# Patient Record
Sex: FEMALE | Race: BLACK OR AFRICAN AMERICAN | NOT HISPANIC OR LATINO | Employment: FULL TIME | URBAN - METROPOLITAN AREA
[De-identification: names, ages, dates, MRNs, and addresses within clinical notes are randomized per-mention and may not be internally consistent; named-entity substitution may affect disease eponyms.]

---

## 2023-05-25 LAB
HERPES SIMPLEX VIRUS 1 IGG: 4.5 INDEX
HERPES SIMPLEX VIRUS 2 IGG: <0.2 INDEX

## 2023-08-07 DIAGNOSIS — Z87.19 PERSONAL HISTORY OF OTHER DISEASES OF THE DIGESTIVE SYSTEM: ICD-10-CM

## 2023-08-08 RX ORDER — OMEPRAZOLE 40 MG/1
CAPSULE, DELAYED RELEASE ORAL
Qty: 30 CAPSULE | Refills: 2 | Status: SHIPPED | OUTPATIENT
Start: 2023-08-08 | End: 2023-11-07

## 2023-11-02 ENCOUNTER — TELEPHONE (OUTPATIENT)
Dept: OBSTETRICS AND GYNECOLOGY | Facility: CLINIC | Age: 28
End: 2023-11-02
Payer: COMMERCIAL

## 2023-11-02 DIAGNOSIS — B00.9 HSV (HERPES SIMPLEX VIRUS) INFECTION: ICD-10-CM

## 2023-11-03 NOTE — TELEPHONE ENCOUNTER
MARANDAVM informing pt that office will send RF request to Sariah for approval. Advised JOANN is due. Last seen 9/9/22.

## 2023-11-04 RX ORDER — VALACYCLOVIR HYDROCHLORIDE 500 MG/1
TABLET, FILM COATED ORAL
Qty: 90 TABLET | Refills: 2 | Status: SHIPPED | OUTPATIENT
Start: 2023-11-04 | End: 2024-01-04 | Stop reason: SDUPTHER

## 2023-11-06 DIAGNOSIS — B00.9 HSV (HERPES SIMPLEX VIRUS) INFECTION: ICD-10-CM

## 2023-11-06 DIAGNOSIS — Z87.19 PERSONAL HISTORY OF OTHER DISEASES OF THE DIGESTIVE SYSTEM: ICD-10-CM

## 2023-11-06 RX ORDER — VALACYCLOVIR HYDROCHLORIDE 500 MG/1
TABLET, FILM COATED ORAL
Qty: 30 TABLET | Refills: 2 | OUTPATIENT
Start: 2023-11-06

## 2023-11-07 RX ORDER — OMEPRAZOLE 40 MG/1
CAPSULE, DELAYED RELEASE ORAL
Qty: 30 CAPSULE | Refills: 2 | Status: SHIPPED | OUTPATIENT
Start: 2023-11-07 | End: 2024-02-07 | Stop reason: SDUPTHER

## 2023-12-01 ENCOUNTER — APPOINTMENT (OUTPATIENT)
Dept: PRIMARY CARE | Facility: CLINIC | Age: 28
End: 2023-12-01
Payer: COMMERCIAL

## 2023-12-08 ENCOUNTER — APPOINTMENT (OUTPATIENT)
Dept: OBSTETRICS AND GYNECOLOGY | Facility: CLINIC | Age: 28
End: 2023-12-08
Payer: COMMERCIAL

## 2023-12-22 ENCOUNTER — OFFICE VISIT (OUTPATIENT)
Dept: PRIMARY CARE | Facility: CLINIC | Age: 28
End: 2023-12-22
Payer: COMMERCIAL

## 2023-12-22 VITALS
SYSTOLIC BLOOD PRESSURE: 114 MMHG | OXYGEN SATURATION: 99 % | BODY MASS INDEX: 26.7 KG/M2 | HEIGHT: 64 IN | DIASTOLIC BLOOD PRESSURE: 58 MMHG | HEART RATE: 92 BPM | WEIGHT: 156.4 LBS

## 2023-12-22 DIAGNOSIS — Z00.00 HEALTH CARE MAINTENANCE: Primary | ICD-10-CM

## 2023-12-22 DIAGNOSIS — K61.0 PERIANAL ABSCESS: ICD-10-CM

## 2023-12-22 DIAGNOSIS — R19.4 CHANGE IN BOWEL HABITS: ICD-10-CM

## 2023-12-22 PROCEDURE — 1036F TOBACCO NON-USER: CPT | Performed by: INTERNAL MEDICINE

## 2023-12-22 PROCEDURE — 99395 PREV VISIT EST AGE 18-39: CPT | Performed by: INTERNAL MEDICINE

## 2023-12-22 RX ORDER — NORETHINDRONE ACETATE AND ETHINYL ESTRADIOL AND FERROUS FUMARATE 1MG-20(21)
KIT ORAL
COMMUNITY

## 2023-12-22 ASSESSMENT — PATIENT HEALTH QUESTIONNAIRE - PHQ9
1. LITTLE INTEREST OR PLEASURE IN DOING THINGS: SEVERAL DAYS
10. IF YOU CHECKED OFF ANY PROBLEMS, HOW DIFFICULT HAVE THESE PROBLEMS MADE IT FOR YOU TO DO YOUR WORK, TAKE CARE OF THINGS AT HOME, OR GET ALONG WITH OTHER PEOPLE: SOMEWHAT DIFFICULT
SUM OF ALL RESPONSES TO PHQ9 QUESTIONS 1 AND 2: 2
2. FEELING DOWN, DEPRESSED OR HOPELESS: SEVERAL DAYS

## 2023-12-22 NOTE — PROGRESS NOTES
"Reason for Visit: Annual Physical Exam  Allergies and Medications  Patient has no known allergies.  Current Outpatient Medications   Medication Instructions    Junel FE 1/20, 28, 1 mg-20 mcg (21)/75 mg (7) tablet TAKE 1 TABLET DAILY FOR CONTINUOUS USE    omeprazole (PriLOSEC) 40 mg DR capsule TAKE 1 CAPSULE BY MOUTH EVERY DAY    valACYclovir (Valtrex) 500 mg tablet Take one tab by mouth daily<BR>Annual exam due ASAP     HPI:  28-year-old patient presented to the office today for her annual exam patient is doing great denying chest pain or shortness of breath lying on exertion denies abdominal pain nausea vomiting changes in bowel movements or blood in stool no urine symptoms her appetite is good her energy level is adequate.    Patient was recently treated for perianal abscess by colon and rectal surgery department she received Augmentin after the abscess drained on its own.  She has history of constipation and hemorrhoids.  She noted a small lump in the anal area and she is concerned.  She has never had any perianal procedures done in the past that she has never had any gastroenterology evaluation on colonoscopies.    ROS otherwise negative aside from what was mentioned above in HPI.    Vitals  /58 (BP Location: Right arm, Patient Position: Sitting)   Pulse 92   Ht 1.626 m (5' 4\")   Wt 70.9 kg (156 lb 6.4 oz)   SpO2 99%   BMI 26.85 kg/m²   Body mass index is 26.85 kg/m².  Physical Exam  Physical Exam  Constitutional:       Appearance: Normal appearance.   Eyes:      Extraocular Movements: Extraocular movements intact.      Pupils: Pupils are equal, round, and reactive to light.   Cardiovascular:      Rate and Rhythm: Normal rate and regular rhythm.      Pulses: Normal pulses.      Heart sounds: Normal heart sounds.   Pulmonary:      Effort: Pulmonary effort is normal.      Breath sounds: Normal breath sounds.   Neurological:      General: No focal deficit present.      Mental Status: She is alert. Mental " status is at baseline.   Psychiatric:         Mood and Affect: Mood normal.         Thought Content: Thought content normal.         Judgment: Judgment normal.        Active Problem List    Comprehensive Medical/Surgical/Social/Family History  Past Medical History:   Diagnosis Date    Abnormal cytological findings in specimens from other organs, systems and tissues     Abnormal cytology    Allergy status to unspecified drugs, medicaments and biological substances 2019    History of allergy    Encounter for contraceptive management, unspecified 2019    Encounter for contraceptive management    Encounter for gynecological examination (general) (routine) with abnormal findings 2019    Encounter for well woman exam with abnormal findings    Encounter for insertion of intrauterine contraceptive device     Encounter for IUD insertion    Encounter for surveillance of implantable subdermal contraceptive 2019    Nexplanon removal    Personal history of other diseases of the digestive system 10/28/2019    History of gastroesophageal reflux (GERD)    Personal history of other diseases of the respiratory system 2015    History of asthma     Past Surgical History:   Procedure Laterality Date    OTHER SURGICAL HISTORY  2019    No history of surgery     Social History     Social History Narrative    Not on file   Never smoked, alcohol socially , one cup of coffee a week , work in education non profit manager for [employer] ,  No family history on file.   FAMILY HISTORY:  Father is  , mother has history of autoimmune illnesses mom with sarcoidosis and her twin sister and father the patients grandfather , 3 sisters and one brother in good health ,  Assessment and Plan:  Problem List Items Addressed This Visit    None  28-year-old patient with the following issues.    1.  History of recent perianal abscess status posttreatment with colorectal surgery department received  Augmentin abscess resolved currently experiencing a small lump in perianal area on exam I did not appreciate any hemorrhoids or fissures or abnormalities.  She is going to contact them and follow their recommendation from that standpoint.  I did discuss with the patient with gastroenterology referral is warranted to evaluate further for change in bowel habits and the perianal abscess that she had no prior surgeries or procedures.  She does try to avoid constipation and increase hydration.    2.  Health maintenance issues, lab work is requested she is up-to-date on Pap smear.    No other active issues or concerns I will see the patient back in the office in 1 year for physical and sooner if needed

## 2024-01-04 ENCOUNTER — OFFICE VISIT (OUTPATIENT)
Dept: OBSTETRICS AND GYNECOLOGY | Facility: CLINIC | Age: 29
End: 2024-01-04
Payer: COMMERCIAL

## 2024-01-04 ENCOUNTER — LAB (OUTPATIENT)
Dept: LAB | Facility: LAB | Age: 29
End: 2024-01-04
Payer: COMMERCIAL

## 2024-01-04 VITALS
SYSTOLIC BLOOD PRESSURE: 122 MMHG | WEIGHT: 157 LBS | BODY MASS INDEX: 26.8 KG/M2 | DIASTOLIC BLOOD PRESSURE: 60 MMHG | HEIGHT: 64 IN

## 2024-01-04 DIAGNOSIS — Z11.3 SCREEN FOR STD (SEXUALLY TRANSMITTED DISEASE): ICD-10-CM

## 2024-01-04 DIAGNOSIS — Z01.419 WELL WOMAN EXAM: Primary | ICD-10-CM

## 2024-01-04 DIAGNOSIS — Z00.00 HEALTH CARE MAINTENANCE: ICD-10-CM

## 2024-01-04 DIAGNOSIS — B00.9 HSV (HERPES SIMPLEX VIRUS) INFECTION: ICD-10-CM

## 2024-01-04 LAB
ALBUMIN SERPL BCP-MCNC: 4.4 G/DL (ref 3.4–5)
ALP SERPL-CCNC: 44 U/L (ref 33–110)
ALT SERPL W P-5'-P-CCNC: 16 U/L (ref 7–45)
ANION GAP SERPL CALC-SCNC: 11 MMOL/L (ref 10–20)
AST SERPL W P-5'-P-CCNC: 17 U/L (ref 9–39)
BILIRUB SERPL-MCNC: 0.5 MG/DL (ref 0–1.2)
BUN SERPL-MCNC: 7 MG/DL (ref 6–23)
CALCIUM SERPL-MCNC: 9.2 MG/DL (ref 8.6–10.3)
CHLORIDE SERPL-SCNC: 103 MMOL/L (ref 98–107)
CHOLEST SERPL-MCNC: 202 MG/DL (ref 0–199)
CHOLESTEROL/HDL RATIO: 3.9
CO2 SERPL-SCNC: 28 MMOL/L (ref 21–32)
CREAT SERPL-MCNC: 0.85 MG/DL (ref 0.5–1.05)
ERYTHROCYTE [DISTWIDTH] IN BLOOD BY AUTOMATED COUNT: 13.7 % (ref 11.5–14.5)
GFR SERPL CREATININE-BSD FRML MDRD: >90 ML/MIN/1.73M*2
GLUCOSE SERPL-MCNC: 88 MG/DL (ref 74–99)
HBV SURFACE AG SERPL QL IA: NONREACTIVE
HCT VFR BLD AUTO: 38.4 % (ref 36–46)
HCV AB SER QL: NONREACTIVE
HDLC SERPL-MCNC: 51.4 MG/DL
HGB BLD-MCNC: 12.5 G/DL (ref 12–16)
HIV 1+2 AB+HIV1 P24 AG SERPL QL IA: NONREACTIVE
LDLC SERPL CALC-MCNC: 142 MG/DL
MCH RBC QN AUTO: 27.4 PG (ref 26–34)
MCHC RBC AUTO-ENTMCNC: 32.6 G/DL (ref 32–36)
MCV RBC AUTO: 84 FL (ref 80–100)
NON HDL CHOLESTEROL: 151 MG/DL (ref 0–149)
NRBC BLD-RTO: 0 /100 WBCS (ref 0–0)
PLATELET # BLD AUTO: 263 X10*3/UL (ref 150–450)
POTASSIUM SERPL-SCNC: 4.1 MMOL/L (ref 3.5–5.3)
PROT SERPL-MCNC: 7.5 G/DL (ref 6.4–8.2)
RBC # BLD AUTO: 4.56 X10*6/UL (ref 4–5.2)
SODIUM SERPL-SCNC: 138 MMOL/L (ref 136–145)
T PALLIDUM AB SER QL: NONREACTIVE
TRIGL SERPL-MCNC: 44 MG/DL (ref 0–149)
TSH SERPL-ACNC: 1.03 MIU/L (ref 0.44–3.98)
VLDL: 9 MG/DL (ref 0–40)
WBC # BLD AUTO: 5.1 X10*3/UL (ref 4.4–11.3)

## 2024-01-04 PROCEDURE — 86780 TREPONEMA PALLIDUM: CPT

## 2024-01-04 PROCEDURE — 85027 COMPLETE CBC AUTOMATED: CPT

## 2024-01-04 PROCEDURE — 87340 HEPATITIS B SURFACE AG IA: CPT

## 2024-01-04 PROCEDURE — 86803 HEPATITIS C AB TEST: CPT

## 2024-01-04 PROCEDURE — 87661 TRICHOMONAS VAGINALIS AMPLIF: CPT

## 2024-01-04 PROCEDURE — 87800 DETECT AGNT MULT DNA DIREC: CPT

## 2024-01-04 PROCEDURE — 80061 LIPID PANEL: CPT

## 2024-01-04 PROCEDURE — 80053 COMPREHEN METABOLIC PANEL: CPT

## 2024-01-04 PROCEDURE — 84443 ASSAY THYROID STIM HORMONE: CPT

## 2024-01-04 PROCEDURE — 87389 HIV-1 AG W/HIV-1&-2 AB AG IA: CPT

## 2024-01-04 PROCEDURE — 36415 COLL VENOUS BLD VENIPUNCTURE: CPT

## 2024-01-04 PROCEDURE — 1036F TOBACCO NON-USER: CPT | Performed by: ADVANCED PRACTICE MIDWIFE

## 2024-01-04 PROCEDURE — 99395 PREV VISIT EST AGE 18-39: CPT | Performed by: ADVANCED PRACTICE MIDWIFE

## 2024-01-04 RX ORDER — VALACYCLOVIR HYDROCHLORIDE 500 MG/1
TABLET, FILM COATED ORAL
Qty: 90 TABLET | Refills: 3 | Status: SHIPPED | OUTPATIENT
Start: 2024-01-04

## 2024-01-04 RX ORDER — LIDOCAINE 50 MG/G
OINTMENT TOPICAL AS NEEDED
Qty: 50 G | Refills: 1 | Status: SHIPPED | OUTPATIENT
Start: 2024-01-04 | End: 2025-01-03

## 2024-01-04 ASSESSMENT — PAIN SCALES - GENERAL: PAINLEVEL: 0-NO PAIN

## 2024-01-05 LAB
C TRACH RRNA SPEC QL NAA+PROBE: NEGATIVE
N GONORRHOEA DNA SPEC QL PROBE+SIG AMP: NEGATIVE
T VAGINALIS RRNA SPEC QL NAA+PROBE: NEGATIVE

## 2024-01-17 NOTE — PROGRESS NOTES
Catherine Caputo is a 28 y.o. female with past medical history of GERD and lactose intolerance who is referred by PCP Dr. Mary Kate Trammell for change in bowel habits and perianal abscess. A few years ago she had first perianal abscess. Went to ER and had antibiotics. She states this would come and go. She states in  had an occurrence that was so painful she had difficulty walking. This was typically managed by going to the ER to receive antibiotics. Abscess is always on the same side but sometimes further up or down. Under the skin feels like it tracks under the skin. She recently saw Colorectal Surgery for a recurrence. She received Augmentin after the abscess drained on its own. Not currently draining or painful. She has never received any pelvic imaging. She states at baseline she has fluctuating bowels, previously lots of constipation. She has added probiotic and fiber which helps. She now has bowel movements pretty much daily. Occasional diarrhea about once a week. She does not see blood, but has mucus. She has lower abdominal cramping prior to defecation, nausea, and lots of bloating. Heartburn controlled with Omeprazole. If she misses a dose heartburn returns. No unintentional weight loss. Denies oral aphthous ulcerations, skin rashes, and joint pains. Recent labs reviewed. She has no iron deficiency anemia. CMP unremarkable. TSH normal. She does not take NSAIDS regularly. No prior EGD or colonoscopy.    Social history: Tries to eat healthy. Lactose intolerant and red meat bothers her. Works out at the gym. 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 Has a hybrid work schedule with 2 days at home.    Family history: Denies family history of IBD, colon cancer, or other GI disorders or malignancy. Father has lupus,  in 2016. Maternal grandfather and mother have sarcoidosis.     Past Medical History:   Diagnosis Date    Encounter for surveillance of implantable subdermal contraceptive  11/01/2019    Nexplanon removal    HSV infection     Personal history of other diseases of the digestive system 10/28/2019    History of gastroesophageal reflux (GERD)    Personal history of other diseases of the respiratory system 03/21/2015    History of asthma     Past Surgical History:   Procedure Laterality Date    COLPOSCOPY      OTHER SURGICAL HISTORY  11/01/2019    No history of surgery     Current Outpatient Medications   Medication Sig Dispense Refill    Junel FE 1/20, 28, 1 mg-20 mcg (21)/75 mg (7) tablet TAKE 1 TABLET DAILY FOR CONTINUOUS USE      lidocaine (Xylocaine) 5 % ointment Apply topically if needed for mild pain (1 - 3) (on HSV outbreak). 50 g 1    omeprazole (PriLOSEC) 40 mg DR capsule TAKE 1 CAPSULE BY MOUTH EVERY DAY 30 capsule 2    valACYclovir (Valtrex) 500 mg tablet Take one tab by mouth daily 90 tablet 3     No current facility-administered medications for this visit.     No Known Allergies    Family History   Problem Relation Name Age of Onset    Autoimmune disease Mother      Sarcoidosis Mother      Lupus Father      Prostate cancer Father      Bipolar disorder Father      Psoriasis Sister      Sarcoidosis Maternal Grandfather         Review of Systems  Review of Systems negative except as noted in HPI.    Objective     Unable to perform physical exam due to telemedicine visit.    Assessment/Plan     28 y.o. female who presents today for initial clinic visit for recurrent perianal abscess with change in bowels with mucus, lower abdominal pain, nausea, and bloating. Will exclude Crohn's disease with labs, stool test, pelvic imaging, and colonoscopy. This was discussed in detail today.    Recommendations  Continue fiber supplement daily.  Obtain labs and fecal calprotectin.  Obtain pelvic MRI to further evaluate perianal abscess/possible fistula.  Schedule colonoscopy. Discussed procedure and she is agreeable. Golytely sent to pharmacy.  Follow up after procedure, or sooner if  needed.    I performed this visit using real-time telehealth tools, including audio/video connection between patient at their home and myself at home.     Electronically signed by: Melissa David CNP on 1/19/2024 at 10:14 AM

## 2024-01-19 ENCOUNTER — TELEMEDICINE (OUTPATIENT)
Dept: GASTROENTEROLOGY | Facility: CLINIC | Age: 29
End: 2024-01-19
Payer: COMMERCIAL

## 2024-01-19 DIAGNOSIS — K61.0 PERIANAL ABSCESS: Primary | ICD-10-CM

## 2024-01-19 DIAGNOSIS — R19.5 MUCUS IN STOOL: ICD-10-CM

## 2024-01-19 DIAGNOSIS — R10.30 LOWER ABDOMINAL PAIN: ICD-10-CM

## 2024-01-19 DIAGNOSIS — R19.4 CHANGE IN BOWEL HABITS: ICD-10-CM

## 2024-01-19 PROCEDURE — 99204 OFFICE O/P NEW MOD 45 MIN: CPT | Performed by: NURSE PRACTITIONER

## 2024-01-19 RX ORDER — POLYETHYLENE GLYCOL 3350, SODIUM SULFATE ANHYDROUS, SODIUM BICARBONATE, SODIUM CHLORIDE, POTASSIUM CHLORIDE 236; 22.74; 6.74; 5.86; 2.97 G/4L; G/4L; G/4L; G/4L; G/4L
4000 POWDER, FOR SOLUTION ORAL ONCE
Qty: 4000 ML | Refills: 0 | Status: SHIPPED | OUTPATIENT
Start: 2024-01-19 | End: 2024-01-19

## 2024-02-07 DIAGNOSIS — Z87.19 PERSONAL HISTORY OF OTHER DISEASES OF THE DIGESTIVE SYSTEM: ICD-10-CM

## 2024-02-07 RX ORDER — OMEPRAZOLE 40 MG/1
CAPSULE, DELAYED RELEASE ORAL
Qty: 30 CAPSULE | Refills: 2 | Status: SHIPPED | OUTPATIENT
Start: 2024-02-07 | End: 2024-05-10

## 2024-03-08 NOTE — ASSESSMENT & PLAN NOTE
Allergy panel showed class 2 allergy to grass and dog danger, mild sensitivity to cat danger.  Will refer to allergy.    Pap due 2 years  STI screening

## 2024-05-10 DIAGNOSIS — Z87.19 PERSONAL HISTORY OF OTHER DISEASES OF THE DIGESTIVE SYSTEM: ICD-10-CM

## 2024-05-10 RX ORDER — OMEPRAZOLE 40 MG/1
CAPSULE, DELAYED RELEASE ORAL
Qty: 30 CAPSULE | Refills: 2 | Status: SHIPPED | OUTPATIENT
Start: 2024-05-10

## 2024-10-02 DIAGNOSIS — Z87.19 PERSONAL HISTORY OF OTHER DISEASES OF THE DIGESTIVE SYSTEM: ICD-10-CM

## 2024-10-03 RX ORDER — OMEPRAZOLE 40 MG/1
CAPSULE, DELAYED RELEASE ORAL
Qty: 30 CAPSULE | Refills: 2 | Status: SHIPPED | OUTPATIENT
Start: 2024-10-03

## 2024-11-21 ENCOUNTER — APPOINTMENT (OUTPATIENT)
Dept: PRIMARY CARE | Facility: CLINIC | Age: 29
End: 2024-11-21
Payer: COMMERCIAL

## 2024-11-21 ENCOUNTER — LAB (OUTPATIENT)
Dept: LAB | Facility: LAB | Age: 29
End: 2024-11-21
Payer: COMMERCIAL

## 2024-11-21 VITALS
BODY MASS INDEX: 26.5 KG/M2 | HEART RATE: 67 BPM | SYSTOLIC BLOOD PRESSURE: 118 MMHG | OXYGEN SATURATION: 97 % | HEIGHT: 64 IN | WEIGHT: 155.2 LBS | DIASTOLIC BLOOD PRESSURE: 78 MMHG

## 2024-11-21 DIAGNOSIS — R10.30 LOWER ABDOMINAL PAIN: ICD-10-CM

## 2024-11-21 DIAGNOSIS — Z00.00 HEALTH CARE MAINTENANCE: Primary | ICD-10-CM

## 2024-11-21 DIAGNOSIS — M54.50 MIDLINE LOW BACK PAIN WITHOUT SCIATICA, UNSPECIFIED CHRONICITY: ICD-10-CM

## 2024-11-21 DIAGNOSIS — K61.0 PERIANAL ABSCESS: ICD-10-CM

## 2024-11-21 DIAGNOSIS — R19.4 CHANGE IN BOWEL HABITS: ICD-10-CM

## 2024-11-21 DIAGNOSIS — Z00.00 HEALTH CARE MAINTENANCE: ICD-10-CM

## 2024-11-21 DIAGNOSIS — Z81.8 FAMILY HISTORY OF SCHIZOPHRENIA: ICD-10-CM

## 2024-11-21 DIAGNOSIS — F32.A DEPRESSION, UNSPECIFIED DEPRESSION TYPE: ICD-10-CM

## 2024-11-21 LAB
ALBUMIN SERPL BCP-MCNC: 4.3 G/DL (ref 3.4–5)
ALBUMIN SERPL BCP-MCNC: 4.5 G/DL (ref 3.4–5)
ALP SERPL-CCNC: 43 U/L (ref 33–110)
ALP SERPL-CCNC: 46 U/L (ref 33–110)
ALT SERPL W P-5'-P-CCNC: 14 U/L (ref 7–45)
ALT SERPL W P-5'-P-CCNC: 15 U/L (ref 7–45)
ANION GAP SERPL CALC-SCNC: 10 MMOL/L (ref 10–20)
ANION GAP SERPL CALC-SCNC: 11 MMOL/L (ref 10–20)
AST SERPL W P-5'-P-CCNC: 14 U/L (ref 9–39)
AST SERPL W P-5'-P-CCNC: 16 U/L (ref 9–39)
BILIRUB SERPL-MCNC: 0.8 MG/DL (ref 0–1.2)
BILIRUB SERPL-MCNC: 0.8 MG/DL (ref 0–1.2)
BUN SERPL-MCNC: 11 MG/DL (ref 6–23)
BUN SERPL-MCNC: 11 MG/DL (ref 6–23)
CALCIUM SERPL-MCNC: 10.1 MG/DL (ref 8.6–10.6)
CALCIUM SERPL-MCNC: 9.7 MG/DL (ref 8.6–10.6)
CHLORIDE SERPL-SCNC: 102 MMOL/L (ref 98–107)
CHLORIDE SERPL-SCNC: 103 MMOL/L (ref 98–107)
CHOLEST SERPL-MCNC: 168 MG/DL (ref 0–199)
CHOLESTEROL/HDL RATIO: 3.4
CO2 SERPL-SCNC: 28 MMOL/L (ref 21–32)
CO2 SERPL-SCNC: 29 MMOL/L (ref 21–32)
CREAT SERPL-MCNC: 0.81 MG/DL (ref 0.5–1.05)
CREAT SERPL-MCNC: 0.83 MG/DL (ref 0.5–1.05)
CRP SERPL-MCNC: <0.1 MG/DL
EGFRCR SERPLBLD CKD-EPI 2021: >90 ML/MIN/1.73M*2
EGFRCR SERPLBLD CKD-EPI 2021: >90 ML/MIN/1.73M*2
ERYTHROCYTE [DISTWIDTH] IN BLOOD BY AUTOMATED COUNT: 13.4 % (ref 11.5–14.5)
ERYTHROCYTE [DISTWIDTH] IN BLOOD BY AUTOMATED COUNT: 13.5 % (ref 11.5–14.5)
GLUCOSE SERPL-MCNC: 83 MG/DL (ref 74–99)
GLUCOSE SERPL-MCNC: 86 MG/DL (ref 74–99)
HCT VFR BLD AUTO: 36.8 % (ref 36–46)
HCT VFR BLD AUTO: 37.9 % (ref 36–46)
HDLC SERPL-MCNC: 48.8 MG/DL
HGB BLD-MCNC: 12 G/DL (ref 12–16)
HGB BLD-MCNC: 12.1 G/DL (ref 12–16)
LDLC SERPL CALC-MCNC: 111 MG/DL
MCH RBC QN AUTO: 27.6 PG (ref 26–34)
MCH RBC QN AUTO: 28 PG (ref 26–34)
MCHC RBC AUTO-ENTMCNC: 31.9 G/DL (ref 32–36)
MCHC RBC AUTO-ENTMCNC: 32.6 G/DL (ref 32–36)
MCV RBC AUTO: 86 FL (ref 80–100)
MCV RBC AUTO: 86 FL (ref 80–100)
NON HDL CHOLESTEROL: 119 MG/DL (ref 0–149)
NRBC BLD-RTO: 0 /100 WBCS (ref 0–0)
NRBC BLD-RTO: 0 /100 WBCS (ref 0–0)
PLATELET # BLD AUTO: 277 X10*3/UL (ref 150–450)
PLATELET # BLD AUTO: 284 X10*3/UL (ref 150–450)
POTASSIUM SERPL-SCNC: 4.1 MMOL/L (ref 3.5–5.3)
POTASSIUM SERPL-SCNC: 4.2 MMOL/L (ref 3.5–5.3)
PROT SERPL-MCNC: 7.2 G/DL (ref 6.4–8.2)
PROT SERPL-MCNC: 7.3 G/DL (ref 6.4–8.2)
RBC # BLD AUTO: 4.29 X10*6/UL (ref 4–5.2)
RBC # BLD AUTO: 4.39 X10*6/UL (ref 4–5.2)
SODIUM SERPL-SCNC: 137 MMOL/L (ref 136–145)
SODIUM SERPL-SCNC: 138 MMOL/L (ref 136–145)
TRIGL SERPL-MCNC: 39 MG/DL (ref 0–149)
TSH SERPL-ACNC: 1.09 MIU/L (ref 0.44–3.98)
VLDL: 8 MG/DL (ref 0–40)
WBC # BLD AUTO: 5.8 X10*3/UL (ref 4.4–11.3)
WBC # BLD AUTO: 5.8 X10*3/UL (ref 4.4–11.3)

## 2024-11-21 PROCEDURE — 80053 COMPREHEN METABOLIC PANEL: CPT

## 2024-11-21 PROCEDURE — 86140 C-REACTIVE PROTEIN: CPT

## 2024-11-21 PROCEDURE — 85027 COMPLETE CBC AUTOMATED: CPT

## 2024-11-21 PROCEDURE — 3008F BODY MASS INDEX DOCD: CPT | Performed by: INTERNAL MEDICINE

## 2024-11-21 PROCEDURE — 84443 ASSAY THYROID STIM HORMONE: CPT

## 2024-11-21 PROCEDURE — 36415 COLL VENOUS BLD VENIPUNCTURE: CPT

## 2024-11-21 PROCEDURE — 80061 LIPID PANEL: CPT

## 2024-11-21 PROCEDURE — 99395 PREV VISIT EST AGE 18-39: CPT | Performed by: INTERNAL MEDICINE

## 2024-11-21 RX ORDER — NAPROXEN 500 MG/1
500 TABLET ORAL 2 TIMES DAILY PRN
Qty: 60 TABLET | Refills: 0 | Status: SHIPPED | OUTPATIENT
Start: 2024-11-21 | End: 2025-02-19

## 2024-11-21 RX ORDER — ETONOGESTREL 68 MG/1
1 IMPLANT SUBCUTANEOUS ONCE
COMMUNITY

## 2024-11-21 ASSESSMENT — PATIENT HEALTH QUESTIONNAIRE - PHQ9
1. LITTLE INTEREST OR PLEASURE IN DOING THINGS: SEVERAL DAYS
2. FEELING DOWN, DEPRESSED OR HOPELESS: SEVERAL DAYS
SUM OF ALL RESPONSES TO PHQ9 QUESTIONS 1 AND 2: 2
10. IF YOU CHECKED OFF ANY PROBLEMS, HOW DIFFICULT HAVE THESE PROBLEMS MADE IT FOR YOU TO DO YOUR WORK, TAKE CARE OF THINGS AT HOME, OR GET ALONG WITH OTHER PEOPLE: VERY DIFFICULT

## 2024-11-21 NOTE — PROGRESS NOTES
"Reason for Visit: Annual Physical Exam  Allergies and Medications  Patient has no known allergies.  Current Outpatient Medications   Medication Instructions    etonogestrel-eluting contraceptive (Nexplanon) 68 mg implant implant 1 each, Once    lidocaine (Xylocaine) 5 % ointment Topical, As needed    omeprazole (PriLOSEC) 40 mg DR capsule TAKE 1 CAPSULE BY MOUTH EVERY DAY    valACYclovir (Valtrex) 500 mg tablet Take one tab by mouth daily     HPI:    28-year-old patient presented to the office today for her annual exam.  Patient is doing really well she denies episodes of chest pain or difficulty breathing not even on exertion no abdominal pain nausea or vomiting changes in the bowel movements or blood in the stool.  She does have history of acid reflux and it is controlled with the proton pump inhibitor.  She does have a history of perianal abscess I did refer her to gastroenterology to further evaluate patient is supposed to proceed with colonoscopy and evaluation with gastroenterology to rule out any underlying diagnosis of inflammatory bowel disease.  Her bowel movements now are improved.  She denies urine symptoms.  She is dealing with a lot of stress in her life related to her recent promotion at work she is concerned because she does not have a family history of schizophrenia and she wants to be evaluated in psychiatry.  ROS otherwise negative aside from what was mentioned above in HPI.    Vitals  /78 (BP Location: Left arm, Patient Position: Sitting, BP Cuff Size: Adult)   Pulse 67   Ht 1.626 m (5' 4\")   Wt 70.4 kg (155 lb 3.2 oz)   SpO2 97%   BMI 26.64 kg/m²   Body mass index is 26.64 kg/m².  Physical Exam  Physical Exam  Constitutional:       Appearance: Normal appearance.   HENT:      Head: Normocephalic and atraumatic.   Eyes:      Extraocular Movements: Extraocular movements intact.      Pupils: Pupils are equal, round, and reactive to light.   Cardiovascular:      Rate and Rhythm: Normal " rate and regular rhythm.      Pulses: Normal pulses.      Heart sounds: Normal heart sounds.   Pulmonary:      Effort: Pulmonary effort is normal.      Breath sounds: Normal breath sounds.   Chest:      Comments: Breast exam completed no masses asymmetry or discharge.  Abdominal:      General: Abdomen is flat. Bowel sounds are normal.      Palpations: Abdomen is soft.   Neurological:      General: No focal deficit present.      Mental Status: She is alert. Mental status is at baseline.   Psychiatric:         Mood and Affect: Mood normal.         Thought Content: Thought content normal.         Judgment: Judgment normal.            Active Problem List    Comprehensive Medical/Surgical/Social/Family History  Past Medical History:   Diagnosis Date    Encounter for surveillance of implantable subdermal contraceptive 11/01/2019    Nexplanon removal    HSV infection     Personal history of other diseases of the digestive system 10/28/2019    History of gastroesophageal reflux (GERD)    Personal history of other diseases of the respiratory system 03/21/2015    History of asthma     Past Surgical History:   Procedure Laterality Date    COLPOSCOPY      OTHER SURGICAL HISTORY  11/01/2019    No history of surgery     Social History     Social History Narrative    Not on file   Patient does not smoke drinks alcohol socially drinks 1 cup of coffee a day she is a  on 2 TRiQ.  Family History   Problem Relation Name Age of Onset    Autoimmune disease Mother      Sarcoidosis Mother      Lupus Father      Prostate cancer Father      Bipolar disorder Father      Psoriasis Sister      Sarcoidosis Maternal Grandfather            Assessment and Plan:  Problem List Items Addressed This Visit    None  28-year-old patient with the following issues.    1.  History of acid reflux controlled with the use of proton pump inhibitor the plan is to continue no changes.    2.  Recurrent perianal abscess currently no issues  patient definitely needs GI evaluation she already met with gastro she is supposed to proceed with a colonoscopy and I encouraged her to do so.    3.  Concerns regarding depression patient is dealing with a lot of stressors in her life lately related to work she wants to do counseling referral was provided and also referral to psychiatry was provided patient does have family history of bipolar as well as schizophrenia and would like evaluation and psychiatry.    4.  Healthcare maintenance issues patient is up-to-date on her Pap smear and lab work is requested we will discuss results once available.    Disposition I will see the patient back in the office in 1 year for repeat physical and sooner

## 2024-12-02 DIAGNOSIS — T75.3XXS MOTION SICKNESS, SEQUELA: Primary | ICD-10-CM

## 2024-12-02 RX ORDER — SCOLOPAMINE TRANSDERMAL SYSTEM 1 MG/1
1 PATCH, EXTENDED RELEASE TRANSDERMAL
Qty: 10 PATCH | Refills: 0 | Status: SHIPPED | OUTPATIENT
Start: 2024-12-02 | End: 2025-01-31

## 2024-12-30 DIAGNOSIS — B00.9 HSV (HERPES SIMPLEX VIRUS) INFECTION: ICD-10-CM

## 2024-12-31 RX ORDER — VALACYCLOVIR HYDROCHLORIDE 500 MG/1
TABLET, FILM COATED ORAL
Qty: 30 TABLET | Refills: 3 | Status: SHIPPED | OUTPATIENT
Start: 2024-12-31

## 2025-01-14 ENCOUNTER — APPOINTMENT (OUTPATIENT)
Dept: BEHAVIORAL HEALTH | Facility: CLINIC | Age: 30
End: 2025-01-14
Payer: COMMERCIAL

## 2025-01-14 VITALS
TEMPERATURE: 98.5 F | WEIGHT: 156 LBS | RESPIRATION RATE: 16 BRPM | SYSTOLIC BLOOD PRESSURE: 133 MMHG | BODY MASS INDEX: 26.63 KG/M2 | DIASTOLIC BLOOD PRESSURE: 73 MMHG | HEART RATE: 66 BPM | HEIGHT: 64 IN

## 2025-01-14 DIAGNOSIS — F41.9 ANXIETY: ICD-10-CM

## 2025-01-14 DIAGNOSIS — F33.1 MAJOR DEPRESSIVE DISORDER, RECURRENT EPISODE, MODERATE: ICD-10-CM

## 2025-01-14 PROCEDURE — 3008F BODY MASS INDEX DOCD: CPT | Performed by: NURSE PRACTITIONER

## 2025-01-14 PROCEDURE — 99205 OFFICE O/P NEW HI 60 MIN: CPT | Performed by: NURSE PRACTITIONER

## 2025-01-14 RX ORDER — SERTRALINE HYDROCHLORIDE 25 MG/1
25 TABLET, FILM COATED ORAL DAILY
Qty: 30 TABLET | Refills: 1 | Status: SHIPPED | OUTPATIENT
Start: 2025-01-14 | End: 2025-03-15

## 2025-01-14 RX ORDER — HYDROXYZINE HYDROCHLORIDE 10 MG/1
10 TABLET, FILM COATED ORAL 3 TIMES DAILY PRN
Qty: 90 TABLET | Refills: 0 | Status: SHIPPED | OUTPATIENT
Start: 2025-01-14 | End: 2025-02-13

## 2025-01-14 ASSESSMENT — PATIENT HEALTH QUESTIONNAIRE - PHQ9
9. THOUGHTS THAT YOU WOULD BE BETTER OFF DEAD, OR OF HURTING YOURSELF: SEVERAL DAYS
1. LITTLE INTEREST OR PLEASURE IN DOING THINGS: SEVERAL DAYS
10. IF YOU CHECKED OFF ANY PROBLEMS, HOW DIFFICULT HAVE THESE PROBLEMS MADE IT FOR YOU TO DO YOUR WORK, TAKE CARE OF THINGS AT HOME, OR GET ALONG WITH OTHER PEOPLE: SOMEWHAT DIFFICULT
3. TROUBLE FALLING OR STAYING ASLEEP: NEARLY EVERY DAY
5. POOR APPETITE OR OVEREATING: MORE THAN HALF THE DAYS
SUM OF ALL RESPONSES TO PHQ QUESTIONS 1-9: 17
7. TROUBLE CONCENTRATING ON THINGS, SUCH AS READING THE NEWSPAPER OR WATCHING TELEVISION: NEARLY EVERY DAY
SUM OF ALL RESPONSES TO PHQ9 QUESTIONS 1 & 2: 2
2. FEELING DOWN, DEPRESSED OR HOPELESS: SEVERAL DAYS
4. FEELING TIRED OR HAVING LITTLE ENERGY: MORE THAN HALF THE DAYS
8. MOVING OR SPEAKING SO SLOWLY THAT OTHER PEOPLE COULD HAVE NOTICED. OR THE OPPOSITE, BEING SO FIGETY OR RESTLESS THAT YOU HAVE BEEN MOVING AROUND A LOT MORE THAN USUAL: MORE THAN HALF THE DAYS
6. FEELING BAD ABOUT YOURSELF - OR THAT YOU ARE A FAILURE OR HAVE LET YOURSELF OR YOUR FAMILY DOWN: MORE THAN HALF THE DAYS

## 2025-01-14 ASSESSMENT — COLUMBIA-SUICIDE SEVERITY RATING SCALE - C-SSRS
6. HAVE YOU EVER DONE ANYTHING, STARTED TO DO ANYTHING, OR PREPARED TO DO ANYTHING TO END YOUR LIFE?: NO
2. HAVE YOU ACTUALLY HAD ANY THOUGHTS OF KILLING YOURSELF?: NO
ATTEMPT LIFETIME: NO
TOTAL  NUMBER OF INTERRUPTED ATTEMPTS LIFETIME: NO
1. HAVE YOU WISHED YOU WERE DEAD OR WISHED YOU COULD GO TO SLEEP AND NOT WAKE UP?: NO
TOTAL  NUMBER OF ABORTED OR SELF INTERRUPTED ATTEMPTS LIFETIME: NO

## 2025-01-14 ASSESSMENT — ANXIETY QUESTIONNAIRES
1. FEELING NERVOUS, ANXIOUS, OR ON EDGE: NEARLY EVERY DAY
7. FEELING AFRAID AS IF SOMETHING AWFUL MIGHT HAPPEN: NEARLY EVERY DAY
6. BECOMING EASILY ANNOYED OR IRRITABLE: NEARLY EVERY DAY
4. TROUBLE RELAXING: NEARLY EVERY DAY
3. WORRYING TOO MUCH ABOUT DIFFERENT THINGS: MORE THAN HALF THE DAYS
IF YOU CHECKED OFF ANY PROBLEMS ON THIS QUESTIONNAIRE, HOW DIFFICULT HAVE THESE PROBLEMS MADE IT FOR YOU TO DO YOUR WORK, TAKE CARE OF THINGS AT HOME, OR GET ALONG WITH OTHER PEOPLE: VERY DIFFICULT
GAD7 TOTAL SCORE: 19
2. NOT BEING ABLE TO STOP OR CONTROL WORRYING: NEARLY EVERY DAY
5. BEING SO RESTLESS THAT IT IS HARD TO SIT STILL: MORE THAN HALF THE DAYS

## 2025-01-14 NOTE — PROGRESS NOTES
"Time In: 0852  Time Out: 0948        Subjective   Catherine Caputo, a 29 y.o. female, presenting to Psychiatry for evaluation and medication management     Preferred Name:  Catherine    Reason for visit:  Medication management    Chief Complaint:  \"I am trying to get my life together.\"    Current Mood:  \"Anxious\"    HPI  Catherine Caputo is a 29 y.o. female patient with a chief complaint of medication management presenting to outpatient treatment for a scheduled psych outpatient psychiatric evaluation.    The patient reports, \"I am trying to get my life together.\" She reports that she is turning 30 this year. She reports that she has had a tough time this year and her normal coping skills haven't been working lately. She reports that she has had panic attacks while [place] [address]. She reports that she had to pull over because she was crying and couldn't breathe. She reports that she had a break down at her last physical with her PCP who referred her to psych. She reports that she has had 2 therapy appointments so far with a therapist at [facility]. She reports that she wants to make sure that she has what she needs to handle life. She repots that she is currently applying to business school and moved back home. She reports that she does not want to go back to the \"bad space\" she was recently in. She reports that she has a family history of schizophrenia and bipolar. She reports that her father had bipolar and schizophrenia and a paternal sister with bipolar. She reports that there are autoimmune disorders on both sides of the family. She reports that she is a spiritual person and sometimes worries that she feels God is talking to her but then worries that she is hallucinating. She reports that last winter, she was not in a good place, issues with a relationship, hopelessness, feeling like wanting to be done, exhaustion, stress with work and family. She reports that she works very hard to develop healthier " "thought processes because she was having suicidal ideation. She reports that she went to a group therapy session on suicide awareness that she found very helpful. She reports that she has had suicidal thoughts since college. She reports that in 2020, one of her friends passed away via possible suicide on vacation. She reports that she has had some \"really dark days\" in the past in regards to suicide but that she has not acted on the thoughts. She reports that she is able to talk herself out of the thoughts due to protective factors. She reports that she wants to live. She reports that she has been tracking her cycle because she recognizes that it correlates with her periods when her moods get worse. She reports that she takes a supplement that she finds helpful with the symptoms. She reports that living back at home with her mother and youngest sister which has been difficult.     She denies any prior suicide attempts, psychiatric hospitalizations, self harm, or violence. She reports that she has never previously received mental health treatment. She reports that she is motivated to get treatment for her mental health. She reports that she is seeing her therapist every other week. She denies any current suicidal ideation but states that she will get intrusive thoughts of suicide when she gets distressed or overwhelmed. She denies that she has acted on these thoughts in the past.         ADULT Psychiatric Review of Symptoms:  Anxiety: ARIANE       ARIANE: difficult to control worry, excessive anxiety/worry, difficulty concentrating, easily fatigue, irritability, restlessness, sleep disturbance, and See HPI    OCD: Denies    Panic: See HPI    PTSD: Denies    ADHD: Denies    Depression: Depressed mood, anhedonia, appetite decrease, concentration poor, Energy decreased, guilt, loss of interest, sleep decreased, Passive suicidal thoughts, tearfulness, and See HPI    Delirium: Denies    Psychosis: Denies    Laura: " "Denies    Safety Issues: passive death wish         HISTORY    Past Psychiatric History  Current diagnosis: depression, anxiety  Current therapist: Hanna Laguerre at [facility]; sees every other week  Outpatient treatment history: first time seeking treatment is currently  Inpatient treatment history: denies  Substance abuse treatment: denies  History of suicide attempts: denies  History of self-harm: denies  History of violence: denies  Current psychiatric medications: denies  Past psychiatric medications: denies    Addiction/Substance Use  Alcohol use: currently on \"dry January\" but only occasionally has alcohol, social drinking about once per month about 2 glasses of wine or 3 shots  Nicotine use: denies  Drug use:    Opioids: denies   Cocaine: denies   Cannabis/[place] 8: occasional gummies, none in the past years   Methamphetamines: denies   Hallucinogens:  denies  Caffeine use: 1 cup of coffee daily      Social/Developmental History  Occupation: works for college now for a   Relationship status: Never   Household members: lives with mother and youngest sister  Children: none  Siblings: 4 on mom's side; 2 on dad's side  Family relationships: 2 friends are main supports  Stressors: work, applying to business school, moving back in with mom  Grade/school: graduated high school and undergrad; currently applying to business school  Learning problems/IEP: denies  Abuse/neglect history: denies   history: denies  Legal history: denies  Employment history: full time  Interests/strengths: cooking  Guns in the home: denies      Family Psychiatric History  Mental Health: father with schizophrenia and bipolar; sister with bipolar  Substance Abuse: denies  Suicide: 3 friends       Medical History  -PCP: Mary Kate Trammell MD  -TBI/head trauma/LOC/seizure hx: denies  -Medical: denies  -Surgical: denies       Falls  History of falls: No  Have you fallen in the past 12 months: " "No      High Blood pressure  No      Depression Screening:   PHQ9 score: 17  Plan: Medication, Therapy, and Follow up with this writer      Anxiety Screening:  ARIANE-7 Score: 19  Plan: Medication, Therapy, and Follow up with this writer    Patient Health Questionnaire-9 Score: 17 (1/14/2025  9:11 AM)  ARIANE-7 Total Score: 19 (1/14/2025  9:13 AM)       Record Review: brief      Mental Status Exam:  General appearance: Appears state age, appropriate eye contact, adequate grooming and hygiene  Attitude: Calm, cooperative, and engaged in conversation.  Behavior: Appropriate eye contact.   Motor Activity: No psychomotor agitation or retardation. No abnormal movements, tremors or tics. No evidence of extrapyramidal symptoms or tardive dyskinesia.  Speech: Regular rate, rhythm, volume. Spontaneous, no pressured speech.  Mood: \"Anxious.\"   Affect: Appropriate, full range, mood congruent.  Thought Process: Linear, logical, and goal-directed. No loose associations or gross thought disorganization.  Thought Content: Denied current suicidal ideation or thoughts of harm to self, denied homicidal ideation or thoughts of harm to others. No delusional thinking elicited. No perseverations or obsessions identified.   Perception: Did not endorse auditory or visual hallucinations, did not appear to be responding to hallucinatory stimuli.   Cognition: Alert, oriented x3. Preserved attention span and concentration, recent and remote memory. Adequate fund of knowledge. No deficits in language.   Insight: Good, in regards to understanding mental health condition  Judgement: Good           Review of Systems  Eyes  no discharge, no pain  Ears, Nose, Mouth, Throat  no pain, no rhinorrhea, no dysphagia  Resp  no dyspnea, no cough  GI  no nausea, vomiting, diarrhea    no urgency, no dysuria  Muskuloskeletal  no pain, no weakness  Integumentary  no rash  Endocrine   no polyurea  Hematologic  no bruising, no bleeding problems  CV  no palpitations, " no pain  Pulm  no chest pain  Neuro  no weakness, no coordination problems, no dizziness  Constitutional  energy, appetite normal  Psychiatric  see psychiatric review of systems and HPI      OARRS:  Xena Chavez, APRN-CNP on 1/14/2025  8:50 AM  I have personally reviewed the OARRS report for Catherine Caputo. I have considered the risks of abuse, dependence, addiction and diversion        Vitals:  Vitals:    01/14/25 0850   BP: 133/73   Pulse: 66   Resp: 16   Temp: 36.9 °C (98.5 °F)           Current Medications  Current Outpatient Medications on File Prior to Visit   Medication Sig Dispense Refill    etonogestrel-eluting contraceptive (Nexplanon) 68 mg implant implant 1 each by subdermal route 1 time.      naproxen (Naprosyn) 500 mg tablet Take 1 tablet (500 mg) by mouth 2 times a day as needed for mild pain (1 - 3) (pain). 60 tablet 0    omeprazole (PriLOSEC) 40 mg DR capsule TAKE 1 CAPSULE BY MOUTH EVERY DAY 30 capsule 2    scopolamine (Transderm-Scop) 1 mg over 3 days patch 3 day Place 1 patch over 72 hours on the skin every 3rd day if needed (nausea). 10 patch 0    valACYclovir (Valtrex) 500 mg tablet TAKE 1 TABLET BY MOUTH EVERY DAY 30 tablet 3     No current facility-administered medications on file prior to visit.          MEDICAL-DECISION MAKING  Moderate: 1 or more chronic illnesses with exacerbation, progression, or side effects of treatment with Prescription drug management    Time: 63 minutes       IMPRESSION  This is a 29-year-old female who presents for psychiatric evaluation for management of depression and anxiety.  The patient has never previously sought mental health treatment.  The patient does endorse symptoms that correlate with major depressive disorder, recurrent, moderate and anxiety.  The patient recently started therapy within the past couple weeks and is seeing the therapist biweekly.  The patient has never previously been on psychiatric medications but is agreeable to starting Zoloft  25 mg daily for depression and anxiety as well as utilizing as needed hydroxyzine for breakthrough anxiety.  The patient denies current suicidal ideation but does report a history of passive thoughts of death and intrusive suicidal thoughts.  The patient denies ever acting on these thoughts and feels she can manage them at this time when they do occur.  The patient has no prior suicide attempts or psychiatric hospitalizations.  The patient is help seeking and future oriented.  The patient is motivated and engaging in mental health treatment.  The patient is agreeable to following up with this writer on 2/18/2025.     Diagnoses  Problem List Items Addressed This Visit    None  Visit Diagnoses       Major depressive disorder, recurrent episode, moderate        Relevant Medications    sertraline (Zoloft) 25 mg tablet    Other Relevant Orders    Follow Up In Psychiatry    Anxiety        Relevant Medications    sertraline (Zoloft) 25 mg tablet    hydrOXYzine HCL (Atarax) 10 mg tablet    Other Relevant Orders    Follow Up In Psychiatry             SI/HI ASSESSMENT  -Risk Assessment: Catherine Caputo is currently a low acute risk of suicide and self-harm due to no past suicide attempt(s) and not currently endorsing thoughts of suicide. Catherine Caputo is currently a low acute risk of violence and harm to others due to no past history of violence and not currently threatening others.  -Suicidal Risk Factors: unmarried/single, current psychiatric illness, and panic attacks  -Violence Risk Factors: none  -Protective Factors: Yazdanism affiliation/spirituality, strong coping skills, sense of responsibility towards family, social support/connectedness, hopefulness/future orientation, and employment  -Plan to Reduce Risk: Establish medication regimen, outpatient follow-up care, and increase coping skills .         [place] suicide severity rating scale  Suicidal and self-injurious behavior in the past 3 months: denies    Suicidal  and self-injurious behavior in lifetime: Denies    Suicidal ideation (check most severe in past month):  intrusive suicidal thoughts but no plan or intent    Activating events (recent):  work, applying to business school, moving back in with mom    Treatment history: not receiving treatment and no current medications/treatment    Other risk factors: No children and Not     Clinical status (recent): major depressive disorder and agitation or severe anxiety    Protective factors (recent): Identifies reasons for living, living with family/supports, supportive social network or family, highly spiritual, and engaged in work or school    Other protective factors: Female, Age, Employed/in school, and non-    Describe any suicidal, self-injurious, or aggressive behavior (include dates): denies        PLAN  -Start Zoloft 25 mg by mouth daily for depression and anxiety; prescription sent for Zoloft 25 mg by mouth daily, dispense 30 with 1 refill  -Start hydroxyzine 10 mg by mouth three times daily as needed for anxiety; prescription sent for hydroxyzine 10 mg by mouth three times daily as needed for anxiety  -Follow-up with this provider on 2/18/2025  -Continue individual therapy biweekly  -Risks/benefits/assessment of medication interventions discussed with pt; pt agreeable to plan. Will continue to monitor for symptoms management and side effects and adjust plan as needed.  -Motivational interviewing to increase coping skills/behavior regulation.  -Safety plan reviewed.  -Call  Psychiatry at [phone] or communicate through [facility] with issues .   -For [facility] residents, [facility] is a 24/7 hotline you can call for assistance at [phone]) [phone]. Please call 911 or go to your closest Emergency Room if you feel worse. This includes thoughts of hurting yourself or anyone else, or having other troubles such as hearing voices, seeing visions, or having new and scary thoughts about the people  around you.      Review with patient: Treatment plan reviewed with the patient.  Medication risks/benefit reviewed with the patient      Time Spent:    Prep time: 2 minutes  Direct patient time: 56 minutes  Documentation time: 5 minutes  Total time: 63 minutes    TIFF Piña-CNP

## 2025-01-20 ENCOUNTER — OFFICE VISIT (OUTPATIENT)
Dept: SURGERY | Facility: CLINIC | Age: 30
End: 2025-01-20
Payer: COMMERCIAL

## 2025-01-20 VITALS
SYSTOLIC BLOOD PRESSURE: 106 MMHG | HEART RATE: 77 BPM | TEMPERATURE: 97.6 F | WEIGHT: 157 LBS | BODY MASS INDEX: 26.8 KG/M2 | DIASTOLIC BLOOD PRESSURE: 71 MMHG | HEIGHT: 64 IN

## 2025-01-20 DIAGNOSIS — L05.91 PILONIDAL CYST: Primary | ICD-10-CM

## 2025-01-20 PROCEDURE — 99213 OFFICE O/P EST LOW 20 MIN: CPT | Performed by: NURSE PRACTITIONER

## 2025-01-20 PROCEDURE — 3008F BODY MASS INDEX DOCD: CPT | Performed by: NURSE PRACTITIONER

## 2025-01-20 PROCEDURE — 1036F TOBACCO NON-USER: CPT | Performed by: NURSE PRACTITIONER

## 2025-01-20 RX ORDER — AMOXICILLIN AND CLAVULANATE POTASSIUM 875; 125 MG/1; MG/1
1 TABLET, FILM COATED ORAL 2 TIMES DAILY
Qty: 14 TABLET | Refills: 0 | Status: SHIPPED | OUTPATIENT
Start: 2025-01-20 | End: 2025-01-27

## 2025-01-20 ASSESSMENT — ENCOUNTER SYMPTOMS: RECTAL PAIN: 0

## 2025-01-20 ASSESSMENT — PAIN SCALES - GENERAL: PAINLEVEL_OUTOF10: 5

## 2025-01-20 NOTE — PROGRESS NOTES
History Of Present Illness  Catherine Caputo is a 29 y.o. female who has a hx of a left anterior lateral perianal abscess 1 1/2 years ago and hx of a pilonidal cyst    She noticed swelling in the left buttock 1 1/2 weeks.  NO c/o any drainage, just soreness.  No c/o any fever/chills.  She has low back discomfort d/t the abscess.  The lump has decreased over the week, but she still has the left buttock pain.    No issues with her bowel with no constipation or diarrhea.       Past Medical History  She has a past medical history of Encounter for surveillance of implantable subdermal contraceptive (11/01/2019), HSV infection, Personal history of other diseases of the digestive system (10/28/2019), and Personal history of other diseases of the respiratory system (03/21/2015).    Surgical History  She has a past surgical history that includes Other surgical history (11/01/2019) and Colposcopy.     Social History  She reports that she has never smoked. She has never used smokeless tobacco. She reports current alcohol use. She reports that she does not use drugs.    Family History  Family History   Problem Relation Name Age of Onset    Autoimmune disease Mother      Sarcoidosis Mother      Lupus Father      Prostate cancer Father      Bipolar disorder Father      Psoriasis Sister      Sarcoidosis Maternal Grandfather          Allergies  Patient has no known allergies.    Review of Systems   Gastrointestinal:  Negative for rectal pain.   All other systems reviewed and are negative.       Physical Exam  Constitutional:       Appearance: Normal appearance.   HENT:      Head: Normocephalic and atraumatic.   Pulmonary:      Effort: Pulmonary effort is normal.   Genitourinary:     Comments: You can see the 2 pits in the gluteal cleft that area not inflamed.  She has pain with palpation along the upper left gluteal.  No drainage redness or swelling to the surrounding tissue, just pain  Musculoskeletal:         General: Normal range  of motion.   Skin:     General: Skin is warm and dry.   Neurological:      General: No focal deficit present.      Mental Status: She is alert and oriented to person, place, and time.   Psychiatric:         Mood and Affect: Mood normal.         Behavior: Behavior normal.             Assessment/Plan   Catherine had an inflamed pilonidal abscess that has almost completely subsided.  She will has the left buttock pain, but no drainage or swelling.  She will start taking Augmentin for a week.  We talked about pit picking again and she will think about it.  She will call with any issues and will follow up in 2 weeks if the pain does not subside.       Mariann Delgado, APRN-CNP

## 2025-02-18 ENCOUNTER — APPOINTMENT (OUTPATIENT)
Dept: BEHAVIORAL HEALTH | Facility: CLINIC | Age: 30
End: 2025-02-18
Payer: COMMERCIAL

## 2025-02-18 VITALS
HEIGHT: 64 IN | WEIGHT: 156.7 LBS | SYSTOLIC BLOOD PRESSURE: 117 MMHG | BODY MASS INDEX: 26.75 KG/M2 | HEART RATE: 76 BPM | RESPIRATION RATE: 16 BRPM | DIASTOLIC BLOOD PRESSURE: 73 MMHG | TEMPERATURE: 98.6 F

## 2025-02-18 DIAGNOSIS — F33.41 RECURRENT MAJOR DEPRESSION IN PARTIAL REMISSION (CMS-HCC): ICD-10-CM

## 2025-02-18 DIAGNOSIS — F41.9 ANXIETY: ICD-10-CM

## 2025-02-18 PROCEDURE — 99214 OFFICE O/P EST MOD 30 MIN: CPT | Performed by: NURSE PRACTITIONER

## 2025-02-18 PROCEDURE — 3008F BODY MASS INDEX DOCD: CPT | Performed by: NURSE PRACTITIONER

## 2025-02-18 RX ORDER — SERTRALINE HYDROCHLORIDE 25 MG/1
25 TABLET, FILM COATED ORAL DAILY
Qty: 30 TABLET | Refills: 0 | Status: SHIPPED | OUTPATIENT
Start: 2025-03-12 | End: 2025-04-11

## 2025-02-18 RX ORDER — HYDROXYZINE HYDROCHLORIDE 10 MG/1
10 TABLET, FILM COATED ORAL 3 TIMES DAILY PRN
Qty: 90 TABLET | Refills: 0 | Status: SHIPPED | OUTPATIENT
Start: 2025-02-18 | End: 2025-03-20

## 2025-02-18 ASSESSMENT — COLUMBIA-SUICIDE SEVERITY RATING SCALE - C-SSRS
TOTAL  NUMBER OF INTERRUPTED ATTEMPTS SINCE LAST CONTACT: NO
TOTAL  NUMBER OF ABORTED OR SELF INTERRUPTED ATTEMPTS SINCE LAST CONTACT: NO
SUICIDE, SINCE LAST CONTACT: NO
6. HAVE YOU EVER DONE ANYTHING, STARTED TO DO ANYTHING, OR PREPARED TO DO ANYTHING TO END YOUR LIFE?: NO
1. SINCE LAST CONTACT, HAVE YOU WISHED YOU WERE DEAD OR WISHED YOU COULD GO TO SLEEP AND NOT WAKE UP?: NO
2. HAVE YOU ACTUALLY HAD ANY THOUGHTS OF KILLING YOURSELF?: NO
ATTEMPT SINCE LAST CONTACT: NO

## 2025-02-18 NOTE — PROGRESS NOTES
"Time in: 0811  Time out: 0826      Preferred Name:  Catherine    Chief Complaint  \"Things are good.\"      History of Present Illness:  Catherine Caputo is a 29 y.o. female patient with a chief complaint of medication management presenting to outpatient treatment for a scheduled psych outpatient psychiatric follow-up.    The patient reports, \"Things are good.\" She reports that she got into school with 100% tuition covered. She reports that she got into a school in [place] so she will have to move which she is excited about it. She reports that school starts in May [address]. She reports \"I think the meds work.\" She reports that she did have dry mouth for about 2 weeks but it stopped. She reports that the hydroxyzine makes her tired. She reports that she only uses it when she has the \"heart quivers.\" She reports that she has been using it 3 times per week. She reports that she is feeling more restful. She feels that her mood is better with the Zoloft. She reports that she does not feel that it is blunting her emotions. She states that she is still able to cry. She reports that because she is moving, she will have to figure out how to transition her mental health care. She reports that she has been able to tolerate stressors better. She continues to do therapy. She denies any recent suicidal ideation.           Falls  History of falls: No  Have you fallen in the past 12 months: No        High Blood pressure  No        Depression Screening:   Improved symptoms with medications  Plan: Medication, Therapy, and Follow up with this writer        Anxiety Screening:  Improved symptoms with medications  Plan: Medication, Therapy, and Follow up with this writer        Record Review: brief      Mental Status Exam:  General appearance: Appears state age, appropriate eye contact, adequate grooming and hygiene  Attitude: Calm, cooperative, and engaged in conversation.  Behavior: Appropriate eye contact.   Motor Activity: No psychomotor " "agitation or retardation. No abnormal movements, tremors or tics. No evidence of extrapyramidal symptoms or tardive dyskinesia.  Speech: Regular rate, rhythm, volume. Spontaneous, no pressured speech.  Mood: \"Good.\"  Affect: Appropriate, full range, mood congruent.  Thought Process: Linear, logical, and goal-directed. No loose associations or gross thought disorganization.  Thought Content: Denied current suicidal ideation or thoughts of harm to self, denied homicidal ideation or thoughts of harm to others. No delusional thinking elicited. No perseverations or obsessions identified.   Perception: Did not endorse auditory or visual hallucinations, did not appear to be responding to hallucinatory stimuli.   Cognition: Alert, oriented x3. Preserved attention span and concentration, recent and remote memory. Adequate fund of knowledge. No deficits in language.   Insight: Good, in regards to understanding mental health condition  Judgement: Good          Review of Systems  Eyes  no discharge, no pain  Ears, Nose, Mouth, Throat  no pain, no rhinorrhea, no dysphagia  Resp  no dyspnea, no cough  GI  no nausea, vomiting, diarrhea    no urgency, no dysuria  Muskuloskeletal  no pain, no weakness  Integumentary  no rash  Endocrine   no polyurea  Hematologic  no bruising, no bleeding problems  CV  no palpitations, no pain  Pulm  no chest pain  Neuro  no weakness, no coordination problems, no dizziness  Constitutional  energy, appetite normal  Psychiatric  see psychiatric review of systems and HPI        Vitals:  There were no vitals filed for this visit.      OARRS:  Xena Chavez, TIFF-CNP on 2/18/2025  8:10 AM  I have personally reviewed the OARRS report for Catherine Caputo. I have considered the risks of abuse, dependence, addiction and diversion          Current Medications  Current Outpatient Medications on File Prior to Visit   Medication Sig Dispense Refill    etonogestrel-eluting contraceptive (Nexplanon) 68 mg " implant implant 1 each by subdermal route 1 time.      naproxen (Naprosyn) 500 mg tablet Take 1 tablet (500 mg) by mouth 2 times a day as needed for mild pain (1 - 3) (pain). 60 tablet 0    omeprazole (PriLOSEC) 40 mg DR capsule TAKE 1 CAPSULE BY MOUTH EVERY DAY 30 capsule 2    valACYclovir (Valtrex) 500 mg tablet TAKE 1 TABLET BY MOUTH EVERY DAY 30 tablet 3    [DISCONTINUED] sertraline (Zoloft) 25 mg tablet Take 1 tablet (25 mg) by mouth once daily. 30 tablet 1    [DISCONTINUED] hydrOXYzine HCL (Atarax) 10 mg tablet Take 1 tablet (10 mg) by mouth 3 times a day as needed for anxiety. 90 tablet 0     No current facility-administered medications on file prior to visit.         MEDICAL-DECISION MAKING  Moderate: 2 or more stable chronic illnesses with Prescription drug management          IMPRESSION  This is a 29-year-old female who presents for follow-up for management of depression and anxiety. The patient does endorse symptoms that correlate with major depressive disorder, recurrent, in partial remission and anxiety. The patient continues to do therapy and finds it beneficial.  The patient has noticed improvement in symptoms of depression and anxiety since starting the Zoloft.  She also finds the hydroxyzine beneficial for anxiety.  The patient denies any recent suicidal ideation.. The patient has no prior suicide attempts or psychiatric hospitalizations. The patient is help seeking and future oriented.  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  She is aware that she will likely need to transition her care to her provider in [place].  The patient is motivated and engaging in mental health treatment. The patient is agreeable to following up with this writer on 4/1/25      Diagnoses and all orders for this visit:  Recurrent major depression in partial remission (CMS-HCC)  -     Follow Up In Psychiatry  -     sertraline (Zoloft) 25 mg tablet; Take 1 tablet (25 mg) by mouth once  daily. Do not fill before March 12, 2025.  -     Follow Up In Psychiatry; Future  Anxiety  -     Follow Up In Psychiatry  -     hydrOXYzine HCL (Atarax) 10 mg tablet; Take 1 tablet (10 mg) by mouth 3 times a day as needed for anxiety.  -     sertraline (Zoloft) 25 mg tablet; Take 1 tablet (25 mg) by mouth once daily. Do not fill before March 12, 2025.  -     Follow Up In Psychiatry; Future         Diagnoses  Problem List Items Addressed This Visit    None  Visit Diagnoses       Recurrent major depression in partial remission (CMS-HCC)        Relevant Medications    sertraline (Zoloft) 25 mg tablet (Start on 3/12/2025)    Other Relevant Orders    Follow Up In Psychiatry    Anxiety        Relevant Medications    hydrOXYzine HCL (Atarax) 10 mg tablet    sertraline (Zoloft) 25 mg tablet (Start on 3/12/2025)    Other Relevant Orders    Follow Up In Psychiatry                Risk Assessment  Acute risk for suicide: Low  Chronic risk for suicide: Low      SI/HI ASSESSMENT  -Risk Assessment: Catherine Caputo is currently a low acute risk of suicide and self-harm due to no past suicide attempt(s) and not currently endorsing thoughts of suicide. Catherine Caputo is currently a low acute risk of violence and harm to others due to no past history of violence and not currently threatening others.  -Suicidal Risk Factors: unmarried/single, current psychiatric illness, and panic attacks  -Violence Risk Factors: none  -Protective Factors: Orthodox affiliation/spirituality, strong coping skills, sense of responsibility towards family, social support/connectedness, hopefulness/future orientation, and employment  -Plan to Reduce Risk: Establish medication regimen, outpatient follow-up care, and increase coping skills .        [place] suicide severity rating scale  Suicidal and self-injurious behavior in the past 3 months: denies     Suicidal and self-injurious behavior in lifetime: Denies     Suicidal ideation (check most severe in past  month):  denies     Activating events (recent):  work, moving for school     Treatment history: current medications/treatment     Other risk factors: No children and Not      Clinical status (recent): major depressive disorder and anxiety     Protective factors (recent): Identifies reasons for living, living with family/supports, supportive social network or family, highly spiritual, and engaged in work or school     Other protective factors: Female, Age, Employed/in school, and non-     Describe any suicidal, self-injurious, or aggressive behavior (include dates): denies        PLAN  -Continue Zoloft 25 mg by mouth daily for depression and anxiety; prescription sent for Zoloft 25 mg by mouth daily, dispense 30 with no refill to be filled on 3/12/25  -Continue hydroxyzine 10 mg by mouth three times daily as needed for anxiety; prescription sent for hydroxyzine 10 mg by mouth three times daily as needed for anxiety, dispense 90 with no refills  -Follow-up with this provider on 4/1/25  -Continue individual therapy   -Risks/benefits/assessment of medication interventions discussed with pt; pt agreeable to plan. Will continue to monitor for symptoms management and side effects and adjust plan as needed.  -Motivational interviewing to increase coping skills/behavior regulation.  -Safety plan reviewed.  -Call  Psychiatry at [phone] or communicate through [employer] with issues .   -For [facility] residents, [employer] is a 24/7 hotline you can call for assistance at (150) [phone]. Please call 911 or go to your closest Emergency Room if you feel worse. This includes thoughts of hurting yourself or anyone else, or having other troubles such as hearing voices, seeing visions, or having new and scary thoughts about the people around you.      Review with patient: Treatment plan reviewed with the patient.  Medication risks/benefit reviewed with the patient      Time Spent:    Prep time: 2  minutes  Direct patient time: 15 minutes  Documentation time: 2 minutes  Total time: 19 minutes    Xena Brandt, TIFF-CNP

## 2025-03-05 ENCOUNTER — PATIENT MESSAGE (OUTPATIENT)
Dept: PRIMARY CARE | Facility: CLINIC | Age: 30
End: 2025-03-05
Payer: COMMERCIAL

## 2025-03-05 DIAGNOSIS — Z87.19 PERSONAL HISTORY OF OTHER DISEASES OF THE DIGESTIVE SYSTEM: ICD-10-CM

## 2025-03-05 DIAGNOSIS — B00.9 HSV (HERPES SIMPLEX VIRUS) INFECTION: ICD-10-CM

## 2025-03-05 RX ORDER — VALACYCLOVIR HYDROCHLORIDE 500 MG/1
TABLET, FILM COATED ORAL
Qty: 30 TABLET | Refills: 3 | Status: SHIPPED | OUTPATIENT
Start: 2025-03-05

## 2025-03-05 RX ORDER — OMEPRAZOLE 40 MG/1
40 CAPSULE, DELAYED RELEASE ORAL DAILY
Qty: 30 CAPSULE | Refills: 3 | Status: SHIPPED | OUTPATIENT
Start: 2025-03-05

## 2025-04-01 ENCOUNTER — APPOINTMENT (OUTPATIENT)
Dept: BEHAVIORAL HEALTH | Facility: CLINIC | Age: 30
End: 2025-04-01
Payer: COMMERCIAL

## 2025-04-01 DIAGNOSIS — F33.41 RECURRENT MAJOR DEPRESSION IN PARTIAL REMISSION (CMS-HCC): ICD-10-CM

## 2025-04-01 DIAGNOSIS — F41.9 ANXIETY: ICD-10-CM

## 2025-04-01 PROCEDURE — 99214 OFFICE O/P EST MOD 30 MIN: CPT | Performed by: NURSE PRACTITIONER

## 2025-04-01 RX ORDER — HYDROXYZINE HYDROCHLORIDE 10 MG/1
10 TABLET, FILM COATED ORAL 3 TIMES DAILY PRN
Qty: 90 TABLET | Refills: 2 | Status: SHIPPED | OUTPATIENT
Start: 2025-04-01 | End: 2025-06-30

## 2025-04-01 RX ORDER — SERTRALINE HYDROCHLORIDE 25 MG/1
25 TABLET, FILM COATED ORAL DAILY
Qty: 90 TABLET | Refills: 3 | Status: SHIPPED | OUTPATIENT
Start: 2025-04-01 | End: 2026-04-01

## 2025-04-01 NOTE — PROGRESS NOTES
"Time in: 1127  Time out: 1137    An interactive audio and video telecommunication system which permits real time communications between the patient (at the originating site) and provider (at the distant site) was utilized to provide this telehealth service.   Verbal consent was requested and obtained from Catherine Caputo on this date, 04/01/25 for a telehealth visit and the patient's location was confirmed at the time of the visit.     The patient verified date of birth, address and phone number.     Preferred Name:  Catherine    Chief Complaint  \"It was a little rough these last couple weeks.\"       History of Present Illness:  Catherine Caputo is a 29 y.o. female patient with a chief complaint of medication management presenting to outpatient treatment for a scheduled psych outpatient psychiatric follow-up.    The patient reports, \"It was a little rough these last couple weeks.\" She reports that a friend passed away unexpectedly 2 weeks ago. She reports that other than that, things have been good. She reports that she went to [place] to look at the school and looked at apartments. 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 She reports that she has one other housing option that she is waiting to hear back from. She reports that she is hoping to move by the first weekend in May. She reports that she is excited for school. She denies any suicidal ideation. She reports that her depression and anxiety symptoms have been manageable. She reports that her sleep has been \"not good.\" She is on vacation from work this week. She reports that she has not taken the hydroxyzine at night but is open to taking it to see if it helps with sleep. She reports that she is taking it about 1-2 times per day for anxiety.  She continues to engage in therapy and finds it helpful.  She is looking forward to going to school and is excited.  She does not have classes on Friday which will allow her to come back to " "[place] for appointments with this writer.  She does state that she has been a little more irritable recently but feels it is related to looking forward to moving and starting school and having difficulty tolerating her family.  She feels the current dose of Zoloft is managing her symptoms well overall.      Falls  History of falls: No  Have you fallen in the past 12 months: No        High Blood pressure  No        Depression Screening:   Manageable  Plan: Medication, Therapy, and Follow up with this writer        Anxiety Screening:  Manageable  Plan: Medication, Therapy, and Follow up with this writer      Record Review: brief      Mental Status Exam:  General appearance: Appears state age, appropriate eye contact, adequate grooming and hygiene  Attitude: Calm, cooperative, and engaged in conversation.  Behavior: Appropriate eye contact.   Motor Activity: No psychomotor agitation or retardation. No abnormal movements, tremors or tics. No evidence of extrapyramidal symptoms or tardive dyskinesia.  Speech: Regular rate, rhythm, volume. Spontaneous, no pressured speech.  Mood: \"Good.\"   Affect: Appropriate, full range, mood congruent.  Thought Process: Linear, logical, and goal-directed. No loose associations or gross thought disorganization.  Thought Content: Denied current suicidal ideation or thoughts of harm to self, denied homicidal ideation or thoughts of harm to others. No delusional thinking elicited. No perseverations or obsessions identified.   Perception: Did not endorse auditory or visual hallucinations, did not appear to be responding to hallucinatory stimuli.   Cognition: Alert, oriented x3. Preserved attention span and concentration, recent and remote memory. Adequate fund of knowledge. No deficits in language.   Insight: Good, in regards to understanding mental health condition  Judgement: Good        Review of Systems  Eyes  no discharge, no pain  Ears, Nose, Mouth, Throat  no pain, no rhinorrhea, no " dysphagia  Resp  no dyspnea, no cough  GI  no nausea, vomiting, diarrhea    no urgency, no dysuria  Muskuloskeletal  no pain, no weakness  Integumentary  no rash  Endocrine   no polyurea  Hematologic  no bruising, no bleeding problems  CV  no palpitations, no pain  Pulm  no chest pain  Neuro  no weakness, no coordination problems, no dizziness  Constitutional  energy, appetite normal  Psychiatric  see psychiatric review of systems and HPI        Vitals:  There were no vitals filed for this visit.        OARRS:  Xena Chavez, APRN-CNP on 4/1/2025 11:19 AM  I have personally reviewed the OARRS report for Catherine Caputo. I have considered the risks of abuse, dependence, addiction and diversion          Current Medications  Current Outpatient Medications on File Prior to Visit   Medication Sig Dispense Refill    etonogestrel-eluting contraceptive (Nexplanon) 68 mg implant implant 1 each by subdermal route 1 time.      omeprazole (PriLOSEC) 40 mg DR capsule Take 1 capsule (40 mg) by mouth once daily. Do not crush or chew. 30 capsule 3    valACYclovir (Valtrex) 500 mg tablet TAKE 1 TABLET BY MOUTH EVERY DAY 30 tablet 3    [DISCONTINUED] hydrOXYzine HCL (Atarax) 10 mg tablet Take 1 tablet (10 mg) by mouth 3 times a day as needed for anxiety. 90 tablet 0    [DISCONTINUED] sertraline (Zoloft) 25 mg tablet Take 1 tablet (25 mg) by mouth once daily. Do not fill before March 12, 2025. 30 tablet 0     No current facility-administered medications on file prior to visit.         MEDICAL-DECISION MAKING  Moderate: 2 or more stable chronic illnesses with Prescription drug management          IMPRESSION  This is a 29-year-old female who presents for follow-up for management of depression and anxiety. The patient does endorse symptoms that correlate with major depressive disorder, recurrent, in partial remission and anxiety. The patient continues to do therapy and finds it beneficial.  The patient finds the current dose of  Zoloft adequate in managing her symptoms of depression and anxiety overall.  She also finds the hydroxyzine beneficial for anxiety and has been utilizing it 1-2 times daily.  The patient has had some difficulty with sleep in the past month and plans to try the hydroxyzine to see if it helps with sleep.  The patient denies any recent suicidal ideation. The patient has no prior suicide attempts or psychiatric hospitalizations. The patient is help seeking and future oriented.  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  She will be able to come home to see this provider when needed as she does not have classes on Fridays. The patient is agreeable to following up with this writer on 6/30/2025      Diagnoses and all orders for this visit:  Recurrent major depression in partial remission (CMS-HCC)  -     Follow Up In Psychiatry  -     sertraline (Zoloft) 25 mg tablet; Take 1 tablet (25 mg) by mouth once daily.  -     Follow Up In Psychiatry; Future  Anxiety  -     Follow Up In Psychiatry  -     hydrOXYzine HCL (Atarax) 10 mg tablet; Take 1 tablet (10 mg) by mouth 3 times a day as needed for anxiety.  -     sertraline (Zoloft) 25 mg tablet; Take 1 tablet (25 mg) by mouth once daily.  -     Follow Up In Psychiatry; Future         Diagnoses  Problem List Items Addressed This Visit    None  Visit Diagnoses       Recurrent major depression in partial remission (CMS-HCC)        Relevant Medications    sertraline (Zoloft) 25 mg tablet    Other Relevant Orders    Follow Up In Psychiatry    Anxiety        Relevant Medications    hydrOXYzine HCL (Atarax) 10 mg tablet    sertraline (Zoloft) 25 mg tablet    Other Relevant Orders    Follow Up In Psychiatry                Risk Assessment  Acute risk for suicide: Low  Chronic risk for suicide: Low      SI/HI ASSESSMENT  -Risk Assessment: Catherine LUISA Harshal is currently a low acute risk of suicide and self-harm due to no past suicide attempt(s) and not  currently endorsing thoughts of suicide. Catherine Caputo is currently a low acute risk of violence and harm to others due to no past history of violence and not currently threatening others.  -Suicidal Risk Factors: unmarried/single, current psychiatric illness, and panic attacks  -Violence Risk Factors: none  -Protective Factors: [employer] affiliation/spirituality, strong coping skills, sense of responsibility towards family, social support/connectedness, hopefulness/future orientation, and employment  -Plan to Reduce Risk: Establish medication regimen, outpatient follow-up care, and increase coping skills .        [place] suicide severity rating scale  Suicidal and self-injurious behavior in the past 3 months: denies     Suicidal and self-injurious behavior in lifetime: Denies     Suicidal ideation (check most severe in past month):  denies     Activating events (recent):  work, moving for school     Treatment history: current medications/treatment     Other risk factors: No children and Not      Clinical status (recent): major depressive disorder and anxiety     Protective factors (recent): Identifies reasons for living, living with family/supports, supportive social network or family, highly spiritual, and engaged in work or school     Other protective factors: Female, Age, Employed/in school, and non-     Describe any suicidal, self-injurious, or aggressive behavior (include dates): denies        PLAN  -Continue Zoloft 25 mg by mouth daily for depression and anxiety; prescription sent for Zoloft 25 mg by mouth daily, dispense 90 with 3 refills  -Continue hydroxyzine 10 mg by mouth three times daily as needed for anxiety; prescription sent for hydroxyzine 10 mg by mouth three times daily as needed for anxiety, dispense 90 with 2 refills  -Follow-up with this provider on 6/30/25  -Continue individual therapy   -Risks/benefits/assessment of medication interventions discussed with pt; pt agreeable  to plan. Will continue to monitor for symptoms management and side effects and adjust plan as needed.  -Motivational interviewing to increase coping skills/behavior regulation.  -Safety plan reviewed.  -Call  Psychiatry at [phone] or communicate through [employer] with issues .   -For [facility], [employer] is a 24/7 hotline you can call for assistance at [phone]) 581-7570. Please call 911 or go to your closest Emergency Room if you feel worse. This includes thoughts of hurting yourself or anyone else, or having other troubles such as hearing voices, seeing visions, or having new and scary thoughts about the people around you.      Review with patient: Treatment plan reviewed with the patient.  Medication risks/benefit reviewed with the patient      Time Spent:    Prep time: 2 minutes  Direct patient time: 10 minutes  Documentation time: 4 minutes  Total time: 16 minutes    TIFF Piña-CNP

## 2025-04-29 DIAGNOSIS — B00.9 HSV (HERPES SIMPLEX VIRUS) INFECTION: ICD-10-CM

## 2025-04-29 DIAGNOSIS — Z87.19 PERSONAL HISTORY OF OTHER DISEASES OF THE DIGESTIVE SYSTEM: ICD-10-CM

## 2025-04-29 RX ORDER — VALACYCLOVIR HYDROCHLORIDE 500 MG/1
TABLET, FILM COATED ORAL
Qty: 90 TABLET | Refills: 1 | Status: SHIPPED | OUTPATIENT
Start: 2025-04-29

## 2025-04-29 RX ORDER — OMEPRAZOLE 40 MG/1
40 CAPSULE, DELAYED RELEASE ORAL DAILY
Qty: 90 CAPSULE | Refills: 1 | Status: SHIPPED | OUTPATIENT
Start: 2025-04-29

## 2025-06-30 ENCOUNTER — APPOINTMENT (OUTPATIENT)
Dept: BEHAVIORAL HEALTH | Facility: CLINIC | Age: 30
End: 2025-06-30
Payer: COMMERCIAL

## 2025-07-08 DIAGNOSIS — Z87.19 PERSONAL HISTORY OF OTHER DISEASES OF THE DIGESTIVE SYSTEM: ICD-10-CM

## 2025-07-08 RX ORDER — OMEPRAZOLE 40 MG/1
40 CAPSULE, DELAYED RELEASE ORAL DAILY
Qty: 90 CAPSULE | Refills: 2 | Status: SHIPPED | OUTPATIENT
Start: 2025-07-08

## 2025-12-23 ENCOUNTER — APPOINTMENT (OUTPATIENT)
Dept: PRIMARY CARE | Facility: CLINIC | Age: 30
End: 2025-12-23
Payer: COMMERCIAL